# Patient Record
Sex: MALE | Race: WHITE | NOT HISPANIC OR LATINO | Employment: UNEMPLOYED | URBAN - METROPOLITAN AREA
[De-identification: names, ages, dates, MRNs, and addresses within clinical notes are randomized per-mention and may not be internally consistent; named-entity substitution may affect disease eponyms.]

---

## 2021-08-18 ENCOUNTER — APPOINTMENT (OUTPATIENT)
Dept: RADIOLOGY | Facility: CLINIC | Age: 61
End: 2021-08-18
Payer: COMMERCIAL

## 2021-08-18 ENCOUNTER — OFFICE VISIT (OUTPATIENT)
Dept: URGENT CARE | Facility: CLINIC | Age: 61
End: 2021-08-18
Payer: COMMERCIAL

## 2021-08-18 VITALS
OXYGEN SATURATION: 98 % | RESPIRATION RATE: 18 BRPM | TEMPERATURE: 97.4 F | WEIGHT: 214 LBS | HEIGHT: 72 IN | BODY MASS INDEX: 28.99 KG/M2 | HEART RATE: 124 BPM

## 2021-08-18 DIAGNOSIS — M70.61 GREATER TROCHANTERIC BURSITIS OF RIGHT HIP: ICD-10-CM

## 2021-08-18 DIAGNOSIS — M70.61 GREATER TROCHANTERIC BURSITIS OF RIGHT HIP: Primary | ICD-10-CM

## 2021-08-18 PROCEDURE — 73502 X-RAY EXAM HIP UNI 2-3 VIEWS: CPT

## 2021-08-18 PROCEDURE — 99213 OFFICE O/P EST LOW 20 MIN: CPT | Performed by: PHYSICIAN ASSISTANT

## 2021-08-18 RX ORDER — LORATADINE 10 MG/1
10 CAPSULE, LIQUID FILLED ORAL
COMMUNITY

## 2021-08-18 RX ORDER — PREDNISONE 10 MG/1
TABLET ORAL
Qty: 18 TABLET | Refills: 0 | Status: SHIPPED | COMMUNITY
Start: 2021-08-18

## 2021-08-18 RX ORDER — ATORVASTATIN CALCIUM 40 MG/1
TABLET, FILM COATED ORAL
COMMUNITY
Start: 2021-07-06

## 2021-08-18 RX ORDER — MELOXICAM 15 MG/1
TABLET ORAL
COMMUNITY
Start: 2021-04-06

## 2021-08-18 NOTE — PROGRESS NOTES
[facility]        NAME: Luis Alfredo Rodriguez is a 63 y o  male  : 1960    MRN: [id number]  DATE: 2021  TIME: 12:08 PM    Assessment and Plan   Greater trochanteric bursitis of right hip [M70 61]  1  Greater trochanteric bursitis of right hip  XR hip/pelv 2-3 vws right if performed    predniSONE 10 mg tablet         Patient Instructions     Patient Instructions    X-ray shows enthesophytes  Over the right greater trochanter  He has greater trochanteric bursitis and pain  Will treat with short course steroid taper  He will check his sugars at home  His last A1c was 6 9  I gave him home exercises since he does not insurance  Ice the hip  Follow up with PCP in 3-5 days  Proceed to  ER if symptoms worsen  Chief Complaint     Chief Complaint   Patient presents with    Leg Pain     last Fri stabbing pain in R hip  states he has had for a while but pain is woresning  hx type 2 DM  History of Present Illness         32-year-old male presents with his wife for right hip pain for last 2 weeks off and on  He points to the lateral hip and buttock area  No fall or trauma  He has pain when he sits for a while goes to stand up  Pain with laying down that side and walking  No groin pain  Taking Mobic  Review of Systems   Review of Systems   Constitutional: Negative  HENT: Negative  Respiratory: Negative  Cardiovascular: Negative  Gastrointestinal: Negative  Musculoskeletal: Positive for arthralgias and gait problem  Negative for joint swelling           Current Medications       Current Outpatient Medications:     atorvastatin (LIPITOR) 40 mg tablet, TAKE 1 TABLET BY MOUTH EVERY DAY, Disp: , Rfl:     Loratadine 10 MG CAPS, Take 10 mg by mouth, Disp: , Rfl:     meloxicam (MOBIC) 15 mg tablet, TAKE 1 TABLET BY MOUTH DAILY FOR PAIN, Disp: , Rfl:     metFORMIN (GLUCOPHAGE) 1000 MG tablet, Take 1,000 mg by mouth 2 (two) times a day with meals, Disp: , Rfl:   predniSONE 10 mg tablet, 3 tabs daily for 3 days, 2 tabs daily for 3 days, 1 tab daily for 3 days, Disp: 18 tablet, Rfl: 0    Current Allergies     Allergies as of 08/18/2021 - Reviewed 08/18/2021   Allergen Reaction Noted    Ace inhibitors Cough 04/12/2019            The following portions of the patient's history were reviewed and updated as appropriate: allergies, current medications, past family history, past medical history, past social history, past surgical history and problem list      No past medical history on file  No past surgical history on file  No family history on file  Medications have been verified  Objective   Pulse (!) 124   Temp (!) 97 4 °F (36 3 °C) (Temporal)   Resp 18   Ht 6' (1 829 m)   Wt 97 1 kg (214 lb)   SpO2 98%   BMI 29 02 kg/m²        Physical Exam     Physical Exam  Constitutional:       General: He is not in acute distress  Appearance: He is well-developed  HENT:      Head: Normocephalic and atraumatic  Cardiovascular:      Rate and Rhythm: Normal rate  Pulmonary:      Effort: Pulmonary effort is normal    Musculoskeletal:      Comments: Lumbar spine nontender  Tender right over the right greater trochanter  Painful resisted/active  hip flexion  He has pain with internal and external rotation of the hip  Passive flexion of the hip is maintained  No pain over the groin  No pain with abduction or adduction  Skin:     General: Skin is warm and dry  Neurological:      Mental Status: He is alert and oriented to person, place, and time

## 2021-08-18 NOTE — PATIENT INSTRUCTIONS
X-ray shows enthesophytes  Over the right greater trochanter  He has greater trochanteric bursitis and pain  Will treat with short course steroid taper  He will check his sugars at home  His last A1c was 6 9  I gave him home exercises since he does not insurance  Ice the hip

## 2025-07-30 ENCOUNTER — APPOINTMENT (EMERGENCY)
Dept: CT IMAGING | Facility: HOSPITAL | Age: 65
End: 2025-07-30
Payer: COMMERCIAL

## 2025-07-30 ENCOUNTER — APPOINTMENT (EMERGENCY)
Dept: RADIOLOGY | Facility: HOSPITAL | Age: 65
End: 2025-07-30
Payer: COMMERCIAL

## 2025-07-30 ENCOUNTER — HOSPITAL ENCOUNTER (EMERGENCY)
Facility: HOSPITAL | Age: 65
Discharge: HOME | End: 2025-07-30
Payer: COMMERCIAL

## 2025-07-30 VITALS
RESPIRATION RATE: 18 BRPM | OXYGEN SATURATION: 97 % | SYSTOLIC BLOOD PRESSURE: 173 MMHG | HEART RATE: 99 BPM | TEMPERATURE: 97.9 F | DIASTOLIC BLOOD PRESSURE: 82 MMHG

## 2025-07-30 DIAGNOSIS — R03.0 ELEVATED BLOOD PRESSURE READING: ICD-10-CM
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
## 2025-07-30 DIAGNOSIS — M79.643 HAND PAIN: ICD-10-CM

## 2025-07-30 DIAGNOSIS — K80.20 CHOLELITHIASIS: ICD-10-CM

## 2025-07-30 DIAGNOSIS — M48.02 CERVICAL SPINAL STENOSIS: ICD-10-CM

## 2025-07-30 DIAGNOSIS — R93.0 ABNORMAL CT OF THE HEAD: ICD-10-CM

## 2025-07-30 PROCEDURE — 74176 CT ABD & PELVIS W/O CONTRAST: CPT

## 2025-07-30 PROCEDURE — 99285 EMERGENCY DEPT VISIT HI MDM: CPT

## 2025-07-30 PROCEDURE — 70450 CT HEAD/BRAIN W/O DYE: CPT

## 2025-07-30 PROCEDURE — 73130 X-RAY EXAM OF HAND: CPT

## 2025-07-30 PROCEDURE — 99284 EMERGENCY DEPT VISIT MOD MDM: CPT

## 2025-07-30 PROCEDURE — 72125 CT NECK SPINE W/O DYE: CPT
